# Patient Record
Sex: FEMALE | Race: WHITE | NOT HISPANIC OR LATINO
[De-identification: names, ages, dates, MRNs, and addresses within clinical notes are randomized per-mention and may not be internally consistent; named-entity substitution may affect disease eponyms.]

---

## 2021-06-07 ENCOUNTER — NON-APPOINTMENT (OUTPATIENT)
Age: 31
End: 2021-06-07

## 2021-06-10 PROBLEM — Z00.00 ENCOUNTER FOR PREVENTIVE HEALTH EXAMINATION: Status: ACTIVE | Noted: 2021-06-10

## 2021-06-14 ENCOUNTER — APPOINTMENT (OUTPATIENT)
Dept: OTOLARYNGOLOGY | Facility: CLINIC | Age: 31
End: 2021-06-14
Payer: COMMERCIAL

## 2021-06-14 VITALS — BODY MASS INDEX: 31.54 KG/M2 | TEMPERATURE: 98.3 F | WEIGHT: 178 LBS | HEIGHT: 63 IN

## 2021-06-14 DIAGNOSIS — J32.0 CHRONIC MAXILLARY SINUSITIS: ICD-10-CM

## 2021-06-14 DIAGNOSIS — R43.0 ANOSMIA: ICD-10-CM

## 2021-06-14 DIAGNOSIS — J34.2 DEVIATED NASAL SEPTUM: ICD-10-CM

## 2021-06-14 DIAGNOSIS — R43.9 UNSPECIFIED DISTURBANCES OF SMELL AND TASTE: ICD-10-CM

## 2021-06-14 DIAGNOSIS — Z78.9 OTHER SPECIFIED HEALTH STATUS: ICD-10-CM

## 2021-06-14 PROCEDURE — 99204 OFFICE O/P NEW MOD 45 MIN: CPT | Mod: 25

## 2021-06-14 PROCEDURE — 31231 NASAL ENDOSCOPY DX: CPT

## 2021-06-14 PROCEDURE — 99072 ADDL SUPL MATRL&STAF TM PHE: CPT

## 2021-06-14 NOTE — HISTORY OF PRESENT ILLNESS
[de-identified] : c/o problem with sense of smell and taste - problem started about 5 mos ago.  Was tested for covid and always negative.  No problems breathing thru nose.  Hx of trauma to nose and seen at [facility] - no definite hx of fx but after that had issue with sense of smell and taste.   No nasal discharge.  No hx of freq sinus infections.   Did here crack when nose was injured.  ALso still sl tender over nasal dorsum

## 2021-06-14 NOTE — ASSESSMENT
[FreeTextEntry1] : Patient with hx of loss of sense of smell which began 4-5 mos ago after nasal trauma.  No hx of covid. No evidence of sinusitis - does have mild dns to right.  Recommended CT of sinuses and further treatment pending result - may also consider neuro eval.

## 2021-06-14 NOTE — REASON FOR VISIT
[Initial Consultation] : an initial consultation for [FreeTextEntry2] : no smell and had ?  broken nose in the past - nasal trauma

## 2021-06-14 NOTE — PHYSICAL EXAM
[Nasal Endoscopy Performed] : nasal endoscopy was performed, see procedure section for findings [] : septum deviated to the left [Midline] : trachea located in midline position [Normal] : no rashes [de-identified] : sl tender over nasal dorsum

## 2021-06-23 ENCOUNTER — APPOINTMENT (OUTPATIENT)
Dept: CT IMAGING | Facility: CLINIC | Age: 31
End: 2021-06-23
Payer: COMMERCIAL

## 2021-06-23 ENCOUNTER — OUTPATIENT (OUTPATIENT)
Dept: OUTPATIENT SERVICES | Facility: HOSPITAL | Age: 31
LOS: 1 days | End: 2021-06-23
Payer: COMMERCIAL

## 2021-06-23 DIAGNOSIS — R43.0 ANOSMIA: ICD-10-CM

## 2021-06-23 DIAGNOSIS — R43.9 UNSPECIFIED DISTURBANCES OF SMELL AND TASTE: ICD-10-CM

## 2021-06-23 DIAGNOSIS — J32.0 CHRONIC MAXILLARY SINUSITIS: ICD-10-CM

## 2021-06-23 PROCEDURE — 70486 CT MAXILLOFACIAL W/O DYE: CPT | Mod: 26

## 2021-06-23 PROCEDURE — 70486 CT MAXILLOFACIAL W/O DYE: CPT

## 2021-06-24 ENCOUNTER — NON-APPOINTMENT (OUTPATIENT)
Age: 31
End: 2021-06-24

## 2021-07-26 ENCOUNTER — APPOINTMENT (OUTPATIENT)
Dept: OTOLARYNGOLOGY | Facility: CLINIC | Age: 31
End: 2021-07-26

## 2021-10-06 ENCOUNTER — NON-APPOINTMENT (OUTPATIENT)
Age: 31
End: 2021-10-06

## 2021-12-09 ENCOUNTER — APPOINTMENT (OUTPATIENT)
Dept: NEUROLOGY | Facility: CLINIC | Age: 31
End: 2021-12-09
Payer: COMMERCIAL

## 2021-12-09 VITALS
WEIGHT: 176 LBS | DIASTOLIC BLOOD PRESSURE: 68 MMHG | HEART RATE: 76 BPM | HEIGHT: 63 IN | TEMPERATURE: 97.8 F | SYSTOLIC BLOOD PRESSURE: 118 MMHG | BODY MASS INDEX: 31.18 KG/M2

## 2021-12-09 DIAGNOSIS — R42 DIZZINESS AND GIDDINESS: ICD-10-CM

## 2021-12-09 PROCEDURE — 99203 OFFICE O/P NEW LOW 30 MIN: CPT

## 2021-12-09 RX ORDER — METHYLPREDNISOLONE 4 MG/1
4 TABLET ORAL
Qty: 1 | Refills: 0 | Status: DISCONTINUED | COMMUNITY
Start: 2021-06-14 | End: 2021-12-09

## 2022-03-21 ENCOUNTER — APPOINTMENT (OUTPATIENT)
Dept: NEUROLOGY | Facility: CLINIC | Age: 32
End: 2022-03-21

## 2022-03-21 NOTE — HISTORY OF PRESENT ILLNESS
[FreeTextEntry1] : 31-year-old woman referred by ENT because of a one-year history of loss of smell and diminished noted after a fall, struck her head, did not pass out, at that time. No nasal stuffiness, or a liquid dripping from the nose,no headaches, but did notice inability to taste. Evaluated by ENT, treated with short-term steroids, without any improvement. CT scan maxillofacial was negative for any evidence of mass or fracture.\par More recently noted transient vertigo, induced by change in position, lasting a few minutes then went away. \par No earache, no headache, no diminished hearing, or ringing in the ears. No difficulty walking, no transient or lateral weakness or numbness. \par No history of diabetes, Lyme disease, no hx of  rheumatological or auto immune disorders.

## 2022-03-21 NOTE — DATA REVIEWED
[de-identified] : :  CT MAXILLOFACIAL  \par \par \par PROCEDURE DATE:  06/23/2021  \par  \par \par \par INTERPRETATION:  INDICATION:  Status post trauma with anosmia. Rule out sinusitis or fracture.\par \par TECHNIQUE:    Thin section axial CT imaging of the sinuses was performed. Sagittal and coronal reformations were generated from the thin section axial data. The study was performed with [employer] stereotactic protocol.\par \par COMPARISON EXAMINATION:  None.\par \par FINDINGS:\par FRONTAL SINUSES:  Well developed and clear.\par ETHMOID SINUSES:  Well developed and clear.\par MAXILLARY SINUSES:  Well developed and clear.\par SPHENOID SINUSES:  Well developed and clear.\par NASAL SEPTUM:  Deviated to the right with narrowing of the right nasal cavity\par NASAL CAVITY/NASOPHARYNX:  No polypoid mass. Soft tissue is seen within the olfactory recesses bilaterally which may represent mucosal thickening or apposition of mucosal surfaces.\par POSTOP CHANGES:  None seen.\par RIGHT OSTIOMEATAL UNIT:  A lateralized uncinate process narrows the infundibulum. A lateralized middle turbinate narrows the middle meatus\par LEFT OSTIOMEATAL UNIT:  Prominent pneumatization of the bulla ethmoidalis narrows the infundibulum. A pneumatized middle turbinate narrows the middle meatus.\par SPHENOETHMOIDAL RECESSES:  Patent.\par RIGHT FRONTAL RECESS:  Narrowed by an agger nasi cell and frontal cell\par LEFT FRONTAL RECESS:  Narrowed by a large agger nasi cell which uplifts the frontal sinus floor or merges with a type III frontal cell\par BONES:  The bones surrounding the paranasal sinuses are intact. There is flattening of the right fovea ethmoidalis\par VISUALIZED INTRACRANIAL STRUCTURES:  Normal.\par ORBITAL CONTENTS:  Normal.\par MISCELLANEOUS:  None.\par \par IMPRESSION:   No evidence of acute inflammatory disease of the paranasal sinuses.\par \par No fracture.\par \par Nasal septal deviation to the right.\par \par Narrowing of drainage pathways which may predispose to sinus outflow obstruction.\par \par Flattening of the right fovea ethmoidalis.

## 2022-03-21 NOTE — PHYSICAL EXAM
[General Appearance - Alert] : alert [General Appearance - In No Acute Distress] : in no acute distress [Oriented To Time, Place, And Person] : oriented to person, place, and time [Impaired Insight] : insight and judgment were intact [Affect] : the affect was normal [Person] : oriented to person [Place] : oriented to place [Time] : oriented to time [Concentration Intact] : normal concentrating ability [Visual Intact] : visual attention was ~T not ~L decreased [Naming Objects] : no difficulty naming common objects [Repeating Phrases] : no difficulty repeating a phrase [Writing A Sentence] : no difficulty writing a sentence [Fluency] : fluency intact [Comprehension] : comprehension intact [Reading] : reading intact [Past History] : adequate knowledge of personal past history [Cranial Nerves Optic (II)] : visual acuity intact bilaterally,  visual fields full to confrontation, pupils equal round and reactive to light [Cranial Nerves Oculomotor (III)] : extraocular motion intact [Cranial Nerves Trigeminal (V)] : facial sensation intact symmetrically [Cranial Nerves Facial (VII)] : face symmetrical [Cranial Nerves Vestibulocochlear (VIII)] : hearing was intact bilaterally [Cranial Nerves Glossopharyngeal (IX)] : tongue and palate midline [Cranial Nerves Accessory (XI - Cranial And Spinal)] : head turning and shoulder shrug symmetric [Cranial Nerves Hypoglossal (XII)] : there was no tongue deviation with protrusion [Motor Tone] : muscle tone was normal in all four extremities [Motor Strength] : muscle strength was normal in all four extremities [No Muscle Atrophy] : normal bulk in all four extremities [Sensation Tactile Decrease] : light touch was intact [Abnormal Walk] : normal gait [Balance] : balance was intact [2+] : Ankle jerk left 2+ [Sclera] : the sclera and conjunctiva were normal [PERRL With Normal Accommodation] : pupils were equal in size, round, reactive to light, with normal accommodation [Extraocular Movements] : extraocular movements were intact [Full Visual Field] : full visual field [Outer Ear] : the ears and nose were normal in appearance [Hearing Threshold Finger Rub Not Lipscomb] : hearing was normal [Neck Appearance] : the appearance of the neck was normal [] : the neck was supple [Neck Cervical Mass (___cm)] : no neck mass was observed [Arterial Pulses Carotid] : carotid pulses were normal with no bruits [Past-pointing] : there was no past-pointing [Tremor] : no tremor present [Plantar Reflex Right Only] : normal on the right [Plantar Reflex Left Only] : normal on the left

## 2022-03-21 NOTE — DISCUSSION/SUMMARY
[FreeTextEntry1] : 31-year-old woman history of anosmia, transient vertigo, rule out base of  skull infiltrative process.\par We'll order an MRI of the brain, and internal ear canals with and without gadolinium.\par

## 2022-07-06 ENCOUNTER — NON-APPOINTMENT (OUTPATIENT)
Age: 32
End: 2022-07-06

## 2022-07-08 ENCOUNTER — NON-APPOINTMENT (OUTPATIENT)
Age: 32
End: 2022-07-08

## 2022-07-08 DIAGNOSIS — Z01.818 ENCOUNTER FOR OTHER PREPROCEDURAL EXAMINATION: ICD-10-CM

## 2022-07-12 ENCOUNTER — OUTPATIENT (OUTPATIENT)
Dept: OUTPATIENT SERVICES | Facility: HOSPITAL | Age: 32
LOS: 1 days | End: 2022-07-12
Payer: COMMERCIAL

## 2022-07-12 VITALS
HEIGHT: 63 IN | TEMPERATURE: 98 F | WEIGHT: 178.79 LBS | OXYGEN SATURATION: 99 % | HEART RATE: 76 BPM | DIASTOLIC BLOOD PRESSURE: 80 MMHG | RESPIRATION RATE: 17 BRPM | SYSTOLIC BLOOD PRESSURE: 110 MMHG

## 2022-07-12 DIAGNOSIS — Z91.89 OTHER SPECIFIED PERSONAL RISK FACTORS, NOT ELSEWHERE CLASSIFIED: ICD-10-CM

## 2022-07-12 DIAGNOSIS — Z01.818 ENCOUNTER FOR OTHER PREPROCEDURAL EXAMINATION: ICD-10-CM

## 2022-07-12 DIAGNOSIS — Z33.2 ENCOUNTER FOR ELECTIVE TERMINATION OF PREGNANCY: ICD-10-CM

## 2022-07-12 LAB
BASOPHILS # BLD AUTO: 0.03 K/UL — SIGNIFICANT CHANGE UP (ref 0–0.2)
BASOPHILS NFR BLD AUTO: 0.2 % — SIGNIFICANT CHANGE UP (ref 0–2)
BLD GP AB SCN SERPL QL: SIGNIFICANT CHANGE UP
COMMENT - BLOOD BANK: SIGNIFICANT CHANGE UP
EOSINOPHIL # BLD AUTO: 0.15 K/UL — SIGNIFICANT CHANGE UP (ref 0–0.5)
EOSINOPHIL NFR BLD AUTO: 1.1 % — SIGNIFICANT CHANGE UP (ref 0–6)
HCT VFR BLD CALC: 34 % — LOW (ref 34.5–45)
HGB BLD-MCNC: 11.8 G/DL — SIGNIFICANT CHANGE UP (ref 11.5–15.5)
IMM GRANULOCYTES NFR BLD AUTO: 0.9 % — SIGNIFICANT CHANGE UP (ref 0–1.5)
LYMPHOCYTES # BLD AUTO: 16.8 % — SIGNIFICANT CHANGE UP (ref 13–44)
LYMPHOCYTES # BLD AUTO: 2.24 K/UL — SIGNIFICANT CHANGE UP (ref 1–3.3)
MCHC RBC-ENTMCNC: 31.4 PG — SIGNIFICANT CHANGE UP (ref 27–34)
MCHC RBC-ENTMCNC: 34.7 GM/DL — SIGNIFICANT CHANGE UP (ref 32–36)
MCV RBC AUTO: 90.4 FL — SIGNIFICANT CHANGE UP (ref 80–100)
MONOCYTES # BLD AUTO: 0.77 K/UL — SIGNIFICANT CHANGE UP (ref 0–0.9)
MONOCYTES NFR BLD AUTO: 5.8 % — SIGNIFICANT CHANGE UP (ref 2–14)
NEUTROPHILS # BLD AUTO: 10.03 K/UL — HIGH (ref 1.8–7.4)
NEUTROPHILS NFR BLD AUTO: 75.2 % — SIGNIFICANT CHANGE UP (ref 43–77)
PLATELET # BLD AUTO: 228 K/UL — SIGNIFICANT CHANGE UP (ref 150–400)
RBC # BLD: 3.76 M/UL — LOW (ref 3.8–5.2)
RBC # FLD: 13.1 % — SIGNIFICANT CHANGE UP (ref 10.3–14.5)
WBC # BLD: 13.34 K/UL — HIGH (ref 3.8–10.5)
WBC # FLD AUTO: 13.34 K/UL — HIGH (ref 3.8–10.5)

## 2022-07-12 PROCEDURE — G0463: CPT

## 2022-07-12 NOTE — H&P PST ADULT - NSANTHOSAYNRD_GEN_A_CORE
No. [place] screening performed.  STOP BANG Legend: 0-2 = LOW Risk; 3-4 = INTERMEDIATE Risk; 5-8 = HIGH Risk

## 2022-07-12 NOTE — H&P PST ADULT - ASSESSMENT
Patient educated on surgical scrub, COVID testing, preadmission instructions, medical clearance and day of procedure medications, verbalizes understanding. Pt instructed to stop vitamins/supplements/herbal medications/ASA/NSAIDS for one week prior to surgery and discuss with PMD.    CAPRINI SCORE    AGE RELATED RISK FACTORS                                                             [ ] Age 41-60 years                                            (1 Point)  [ ] Age: 61-74 years                                           (2 Points)                 [ ] Age= 75 years                                                (3 Points)             DISEASE RELATED RISK FACTORS                                                       [ ] Edema in the lower extremities                 (1 Point)                     [ ] Varicose veins                                               (1 Point)                                 [ ] BMI > 25 Kg/m2                                            (1 Point)                                  [ ] Serious infection (ie PNA)                            (1 Point)                     [ ] Lung disease ( COPD, Emphysema)            (1 Point)                                                                          [ ] Acute myocardial infarction                         (1 Point)                  [ ] Congestive heart failure (in the previous month)  (1 Point)         [ ] Inflammatory bowel disease                            (1 Point)                  [ ] Central venous access, PICC or Port               (2 points)       (within the last month)                                                                [ ] Stroke (in the previous month)                        (5 Points)    [ ] Previous or present malignancy                       (2 points)                                                                                                                                                         HEMATOLOGY RELATED FACTORS                                                         [ ] Prior episodes of VTE                                     (3 Points)                     [ ] Positive family history for VTE                      (3 Points)                  [ ] Prothrombin 88288 A                                     (3 Points)                     [ ] Factor V Leiden                                                (3 Points)                        [ ] Lupus anticoagulants                                      (3 Points)                                                           [ ] Anticardiolipin antibodies                              (3 Points)                                                       [ ] High homocysteine in the blood                   (3 Points)                                             [ ] Other congenital or acquired thrombophilia      (3 Points)                                                [ ] Heparin induced thrombocytopenia                  (3 Points)                                        MOBILITY RELATED FACTORS  [ ] Bed rest                                                         (1 Point)  [ ] Plaster cast                                                    (2 points)  [ ] Bed bound for more than 72 hours           (2 Points)    GENDER SPECIFIC FACTORS  [ ] Pregnancy or had a baby within the last month   (1 Point)  [ ] Post-partum < 6 weeks                                   (1 Point)  [ ] Hormonal therapy  or oral contraception   (1 Point)  [ ] History of pregnancy complications              (1 point)  [ ] Unexplained or recurrent              (1 Point)    OTHER RISK FACTORS                                           (1 Point)  [ ] BMI >40, smoking, diabetes requiring insulin, chemotherapy  blood transfusions and length of surgery over 2 hours    SURGERY RELATED RISK FACTORS  [ ]  Section within the last month     (1 Point)  [ ] Minor surgery                                                  (1 Point)  [ ] Arthroscopic surgery                                       (2 Points)  [ ] Planned major surgery lasting more            (2 Points)      than 45 minutes     [ ] Elective hip or knee joint replacement       (5 points)       surgery                                                TRAUMA RELATED RISK FACTORS  [ ] Fracture of the hip, pelvis, or leg                       (5 Points)  [ ] Spinal cord injury resulting in paralysis             (5 points)       (in the previous month)    [ ] Paralysis  (less than 1 month)                             (5 Points)  [ ] Multiple Trauma within 1 month                        (5 Points)    Total Score [        ]    Caprini Score 0-2: Low Risk, NO VTE prophylaxis required for most patients, encourage ambulation  Caprini Score 3-6: Moderate Risk , pharmacologic VTE prophylaxis is indicated for most patients (in the absence of contraindications)  Caprini Score Greater than or =7: High risk, pharmocologic VTE prophylaxis indicated for most patients (in the absence of contraindications)    OPIOID RISK TOOL    MIYA EACH BOX THAT APPLIES AND ADD TOTALS AT THE END    FAMILY HISTORY OF SUBSTANCE ABUSE                 FEMALE         MALE                                                Alcohol                             [  ]1 pt          [  ]3pts                                               Illegal Durgs                     [  ]2 pts        [  ]3pts                                               Rx Drugs                           [  ]4 pts        [  ]4 pts    PERSONAL HISTORY OF SUBSTANCE ABUSE                                                                                          Alcohol                             [  ]3 pts       [  ]3 pts                                               Illegal Drugs                     [  ]4 pts        [  ]4 pts                                               Rx Drugs                           [  ]5 pts        [  ]5 pts    AGE BETWEEN 16-45 YEARS                                      [  ]1 pt         [  ]1 pt    HISTORY OF PREADOLESCENT   SEXUAL ABUSE                                                             [  ]3 pts        [  ]0pts    PSYCHOLOGICAL DISEASE                     ADD, OCD, Bipolar, Schizophrenia        [  ]2 pts         [  ]2 pts                      Depression                                               [  ]1 pt           [  ]1 pt           SCORING TOTAL   (add numbers and type here)              (***)                                     A score of 3 or lower indicated LOW risk for future opioid abuse  A score of 4 to 7 indicated moderate risk for future opioid abuse  A score of 8 or higher indicates a high risk for opioid abuse   32 y/o female  LMP ~5 months ago (irregular periods) presents to PST currently 20 weeks pregnant. Patient states she has irregular menses, was currently on birth control. States she started to feel she was gaining weight so she decided to take a urine home pregnancy test which was positive. States the pregnancy was unplanned and she wishes to terminate. [facility] due to the gestational age of fetus. Patient is scheduled for Ultrasound guided dilation and evacuation second trimester with suction on 22 with Dr. Retana. Patient educated on COVID testing completed on 22, preadmission instructions and day of procedure medications, verbalizes understanding. Pt instructed to stop vitamins/supplements/herbal medications/ASA/NSAIDS for one week prior to surgery and discuss with PMD.    CAPRINI SCORE    AGE RELATED RISK FACTORS                                                             [ ] Age 41-60 years                                            (1 Point)  [ ] Age: 61-74 years                                           (2 Points)                 [ ] Age= 75 years                                                (3 Points)             DISEASE RELATED RISK FACTORS                                                       [ ] Edema in the lower extremities                 (1 Point)                     [ ] Varicose veins                                               (1 Point)                                 [x ] BMI > 25 Kg/m2                                            (1 Point)                                  [ ] Serious infection (ie PNA)                            (1 Point)                     [ ] Lung disease ( COPD, Emphysema)            (1 Point)                                                                          [ ] Acute myocardial infarction                         (1 Point)                  [ ] Congestive heart failure (in the previous month)  (1 Point)         [ ] Inflammatory bowel disease                            (1 Point)                  [ ] Central venous access, PICC or Port               (2 points)       (within the last month)                                                                [ ] Stroke (in the previous month)                        (5 Points)    [ ] Previous or present malignancy                       (2 points)                                                                                                                                                         HEMATOLOGY RELATED FACTORS                                                         [ ] Prior episodes of VTE                                     (3 Points)                     [ ] Positive family history for VTE                      (3 Points)                  [ ] Prothrombin 28977 A                                     (3 Points)                     [ ] Factor V Leiden                                                (3 Points)                        [ ] Lupus anticoagulants                                      (3 Points)                                                           [ ] Anticardiolipin antibodies                              (3 Points)                                                       [ ] High homocysteine in the blood                   (3 Points)                                             [ ] Other congenital or acquired thrombophilia      (3 Points)                                                [ ] Heparin induced thrombocytopenia                  (3 Points)                                        MOBILITY RELATED FACTORS  [ ] Bed rest                                                         (1 Point)  [ ] Plaster cast                                                    (2 points)  [ ] Bed bound for more than 72 hours           (2 Points)    GENDER SPECIFIC FACTORS  [x ] Pregnancy or had a baby within the last month   (1 Point)  [ ] Post-partum < 6 weeks                                   (1 Point)  [ ] Hormonal therapy  or oral contraception   (1 Point)  [ ] History of pregnancy complications              (1 point)  [ ] Unexplained or recurrent              (1 Point)    OTHER RISK FACTORS                                           (1 Point)  [ ] BMI >40, smoking, diabetes requiring insulin, chemotherapy  blood transfusions and length of surgery over 2 hours    SURGERY RELATED RISK FACTORS  [ ]  Section within the last month     (1 Point)  [x ] Minor surgery                                                  (1 Point)  [ ] Arthroscopic surgery                                       (2 Points)  [ ] Planned major surgery lasting more            (2 Points)      than 45 minutes     [ ] Elective hip or knee joint replacement       (5 points)       surgery                                                TRAUMA RELATED RISK FACTORS  [ ] Fracture of the hip, pelvis, or leg                       (5 Points)  [ ] Spinal cord injury resulting in paralysis             (5 points)       (in the previous month)    [ ] Paralysis  (less than 1 month)                             (5 Points)  [ ] Multiple Trauma within 1 month                        (5 Points)    Total Score [   3     ]    Caprini Score 0-2: Low Risk, NO VTE prophylaxis required for most patients, encourage ambulation  Caprini Score 3-6: Moderate Risk , pharmacologic VTE prophylaxis is indicated for most patients (in the absence of contraindications)  Caprini Score Greater than or =7: High risk, pharmocologic VTE prophylaxis indicated for most patients (in the absence of contraindications)    OPIOID RISK TOOL    MIYA EACH BOX THAT APPLIES AND ADD TOTALS AT THE END    FAMILY HISTORY OF SUBSTANCE ABUSE                 FEMALE         MALE                                                Alcohol                             [  ]1 pt          [  ]3pts                                               Illegal Durgs                     [  ]2 pts        [  ]3pts                                               Rx Drugs                           [  ]4 pts        [  ]4 pts    PERSONAL HISTORY OF SUBSTANCE ABUSE                                                                                          Alcohol                             [  ]3 pts       [  ]3 pts                                               Illegal Drugs                     [  ]4 pts        [  ]4 pts                                               Rx Drugs                           [  ]5 pts        [  ]5 pts    AGE BETWEEN 16-45 YEARS                                      [ x ]1 pt         [  ]1 pt    HISTORY OF PREADOLESCENT   SEXUAL ABUSE                                                             [  ]3 pts        [  ]0pts    PSYCHOLOGICAL DISEASE                     ADD, OCD, Bipolar, Schizophrenia        [  ]2 pts         [  ]2 pts                      Depression                                               [  ]1 pt           [  ]1 pt           SCORING TOTAL   (add numbers and type here)              (*1**)                                     A score of 3 or lower indicated LOW risk for future opioid abuse  A score of 4 to 7 indicated moderate risk for future opioid abuse  A score of 8 or higher indicates a high risk for opioid abuse

## 2022-07-12 NOTE — H&P PST ADULT - GASTROINTESTINAL
negative normal/soft/nontender/nondistended/normal active bowel sounds/no guarding/no rigidity/no organomegaly/no masses palpable

## 2022-07-12 NOTE — H&P PST ADULT - CARDIOVASCULAR
negative normal/regular rate and rhythm/S1 S2 present/no gallops/no rub/no murmur/normal PMI/no pedal edema

## 2022-07-12 NOTE — H&P PST ADULT - HISTORY OF PRESENT ILLNESS
30 y/o female  LMP ~5 months ago (irregular periods) presents to PST currently 20 weeks pregnant. Patient states she has irregular menses, was currently on birth control. States she started to feel she was gaining weight so she decided to take a urine home pregnancy test which was positive. States the pregnancy was unplanned and she wishes to terminate. [facility] to [facility] due to the gestational age of fetus. Patient is scheduled for Ultrasound guided dilation and evacuation second trimester with suction on 22 with Dr. Retana.

## 2022-07-12 NOTE — H&P PST ADULT - NEGATIVE ENMT SYMPTOMS
no hearing difficulty/no ear pain/no tinnitus/no nasal discharge/no post-nasal discharge/no dysphagia

## 2022-07-12 NOTE — H&P PST ADULT - MUSCULOSKELETAL
negative normal/ROM intact/no calf tenderness/normal gait/strength 5/5 bilateral upper extremities/strength 5/5 bilateral lower extremities

## 2022-07-12 NOTE — H&P PST ADULT - PROBLEM SELECTOR PLAN 1
Patient is scheduled for Ultrasound guided dilation and evacuation second trimester with suction on 7/14/22 with Dr. Retana.

## 2022-07-13 ENCOUNTER — TRANSCRIPTION ENCOUNTER (OUTPATIENT)
Age: 32
End: 2022-07-13

## 2022-07-13 ENCOUNTER — APPOINTMENT (OUTPATIENT)
Dept: OBGYN | Facility: CLINIC | Age: 32
End: 2022-07-13

## 2022-07-13 VITALS
HEIGHT: 63 IN | HEART RATE: 80 BPM | OXYGEN SATURATION: 99 % | DIASTOLIC BLOOD PRESSURE: 82 MMHG | SYSTOLIC BLOOD PRESSURE: 124 MMHG

## 2022-07-13 DIAGNOSIS — Z33.2 ENCOUNTER FOR ELECTIVE TERMINATION OF PREGNANCY: ICD-10-CM

## 2022-07-13 LAB — SARS-COV-2 N GENE NPH QL NAA+PROBE: NOT DETECTED

## 2022-07-13 PROCEDURE — 59200 INSERT CERVICAL DILATOR: CPT

## 2022-07-13 PROCEDURE — 99205 OFFICE O/P NEW HI 60 MIN: CPT | Mod: 25,57

## 2022-07-13 RX ORDER — DROSPIRENONE AND ETHINYL ESTRADIOL 0.02-3(28)
3-0.02 KIT ORAL DAILY
Qty: 3 | Refills: 3 | Status: ACTIVE | COMMUNITY
Start: 2022-07-13 | End: 1900-01-01

## 2022-07-13 RX ORDER — NORETHINDRONE ACETATE AND ETHINYL ESTRADIOL AND FERROUS FUMARATE 1.5-30(21)
KIT ORAL
Refills: 0 | Status: DISCONTINUED | COMMUNITY
End: 2022-07-13

## 2022-07-13 RX ORDER — MIFEPRISTONE 200 MG
200 TABLET ORAL
Refills: 0 | Status: COMPLETED | OUTPATIENT
Start: 2022-07-13

## 2022-07-13 RX ORDER — DOXYCYCLINE HYCLATE 100 MG/1
100 TABLET ORAL
Qty: 2 | Refills: 0 | Status: ACTIVE | COMMUNITY
Start: 2022-07-13 | End: 1900-01-01

## 2022-07-13 RX ORDER — MIFEPRISTONE 200 MG
200 TABLET ORAL
Refills: 0 | Status: ACTIVE | COMMUNITY
Start: 2022-07-13

## 2022-07-13 RX ORDER — IBUPROFEN 600 MG/1
600 TABLET, FILM COATED ORAL 4 TIMES DAILY
Qty: 60 | Refills: 0 | Status: ACTIVE | COMMUNITY
Start: 2022-07-13 | End: 1900-01-01

## 2022-07-13 RX ADMIN — Medication 0 MG: at 00:00

## 2022-07-13 NOTE — HISTORY OF PRESENT ILLNESS
[FreeTextEntry1] : 30 yo  at 20w3d by second trimester ultrasound confirmed by US today presenting for TOP. Pt referred by [facility] for gestational age. Pt conceived taking OCPs. Pt remains interested in OCPs. Does not want other form of contraception. 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	Infection: Patient was counseled on risk of infection and the use of prophylactic antibiotics, signs/symptoms of pre- and post-operative infection were reviewed. \par 2.	Hemorrhage: Patient was counseled on the risk of hemorrhage, possibly requiring blood (and/or blood products) transfusion, management including use of but not limited to uterotonic medications. PT HAS NO OBJECTIONS TO BLOOD TRANSFUSION OR RECEIVING BLOOD PRODUCTS.\par 3.	Injury/Perforation:  Risk of injury to vagina, cervix, uterus reviewed. Patient was counseled on the risk of uterine perforation with/without need for laparoscopy/laparotomy with/without injury to adjacent organs such as bowel/bladder. Reviewed risk of hysterectomy.\par 4.            Risk of retained products of conception  with/without need for medication or suction procedure to empty the uterus. \par \par The risk of harm to subsequent pregnancies or the ability to carry a subsequent pregnancy to term, and adverse psychological effects were discussed.  \par \par Need for cervical ripening with mifepristone, pre-operative laminaria placement; the accompanying risks of infection, bleeding, injury, rupture of membranes, and  labor were reviewed. The risks of delivery of a nonviable  were reviewed.  They understand these risks and agrees to above. \par \par The patient also understands it is their responsibility to bring to the attention of their physician any unusual symptoms following the  and to report to follow-up examinations.  \par \par They are sure of their decision and deny any coercion from family, friends or healthcare providers. The patient had the opportunity to ask questions and all questions were answered.  \par \par Contraceptive Counseling\par \par All forms of reversible contraception including combined hormonal contraception, progestin-only contraceptives, IUDs, and implants were reviewed with the patient.  The risks, benefits, and alternatives were discussed. \par  \par CHC including pill, patch, and ring, was discussed with the patient. Common side-effects of CHC were reviewed.  Typical effectiveness rates of 91% were reviewed.\par  \par The patient as instructed in the correct use of combined methods. The patient was also counseled about the reduced contraceptive effectiveness if doses are missed and the recommendation for condom use for 1 week after.  The patient was counseled on the risk of blood clot and stroke, but that the risk of stroke from pregnancy outweighs that from CHC.  The patient denies history of blood clot/hypertension/CVA/migraine with aura/breast cancer/liver disease/gallbladder disease/family history of first degree relative with DVT/CVA.   Reviewed the option of continuous CHC and that breakthrough bleeding may occur with a continuous regimen.\par  \par 
negative...

## 2022-07-13 NOTE — PLAN
[FreeTextEntry1] : 30 yo  at 20w3d by second trimester ultrasound 2022\par \par 1.Dilation and Evacuation \par -All available records and ultrasounds have been reviewed \par - Pt does not desire induction of labor\par -All consents reviewed and/or signed today, all questions/concerns addressed\par \par 2. Surgery scheduling\par - Patient to be precertified for D+E\par - D+E scheduled for tomorrow \par -PSTs, COVID testing scheduled and reviewed\par \par 3. ID/Cervical prep\par - GC/CT - declined\par - doxycycline 200 mg in OR\par - Ibuprofen 600 mg po q 6 hours - Rx sent\par - doxycycline 100mg BID x 1 day for day of dilator placement\par - mifepristone today\par \par 4. Labs/Blood type\par - to get CBC, T+S, at PST’s\par -  B POS\par \par 5. Contraception/Future Pregnancy Plans\par - Patient desires quick start  OCPs\par \par 6. Post-op\par - Post-operative telehealth or in person visit optional- to be scheduled in 2 weeks\par - Post-operative instruction sheet reviewed/given, reviewed bleeding and infection precautions\par - Provided 24 hour contact phone number\par - All questions/concerns of patient addressed to their satisfaction\par

## 2022-07-14 ENCOUNTER — APPOINTMENT (OUTPATIENT)
Dept: OBGYN | Facility: CLINIC | Age: 32
End: 2022-07-14

## 2022-07-14 ENCOUNTER — RESULT REVIEW (OUTPATIENT)
Age: 32
End: 2022-07-14

## 2022-07-14 ENCOUNTER — TRANSCRIPTION ENCOUNTER (OUTPATIENT)
Age: 32
End: 2022-07-14

## 2022-07-14 ENCOUNTER — OUTPATIENT (OUTPATIENT)
Dept: INPATIENT UNIT | Facility: HOSPITAL | Age: 32
LOS: 1 days | End: 2022-07-14
Payer: COMMERCIAL

## 2022-07-14 VITALS
DIASTOLIC BLOOD PRESSURE: 71 MMHG | OXYGEN SATURATION: 100 % | HEART RATE: 78 BPM | SYSTOLIC BLOOD PRESSURE: 109 MMHG | TEMPERATURE: 99 F | RESPIRATION RATE: 17 BRPM | WEIGHT: 178.57 LBS | HEIGHT: 63 IN

## 2022-07-14 VITALS
TEMPERATURE: 98 F | DIASTOLIC BLOOD PRESSURE: 62 MMHG | HEART RATE: 67 BPM | SYSTOLIC BLOOD PRESSURE: 121 MMHG | OXYGEN SATURATION: 99 % | RESPIRATION RATE: 19 BRPM

## 2022-07-14 DIAGNOSIS — Z33.2 ENCOUNTER FOR ELECTIVE TERMINATION OF PREGNANCY: ICD-10-CM

## 2022-07-14 PROBLEM — Z78.9 OTHER SPECIFIED HEALTH STATUS: Chronic | Status: ACTIVE | Noted: 2022-07-12

## 2022-07-14 LAB — BLD GP AB SCN SERPL QL: SIGNIFICANT CHANGE UP

## 2022-07-14 PROCEDURE — 86900 BLOOD TYPING SEROLOGIC ABO: CPT

## 2022-07-14 PROCEDURE — 59841 INDUCED ABORTION DILAT&EVAC: CPT

## 2022-07-14 PROCEDURE — 88304 TISSUE EXAM BY PATHOLOGIST: CPT | Mod: 26

## 2022-07-14 PROCEDURE — 88304 TISSUE EXAM BY PATHOLOGIST: CPT

## 2022-07-14 PROCEDURE — 86850 RBC ANTIBODY SCREEN: CPT

## 2022-07-14 PROCEDURE — 86901 BLOOD TYPING SEROLOGIC RH(D): CPT

## 2022-07-14 PROCEDURE — ZZZZZ: CPT

## 2022-07-14 PROCEDURE — 76998 US GUIDE INTRAOP: CPT | Mod: 26

## 2022-07-14 PROCEDURE — 36415 COLL VENOUS BLD VENIPUNCTURE: CPT

## 2022-07-14 PROCEDURE — 59840 INDUCED ABORTION D&C: CPT | Mod: GC

## 2022-07-14 RX ORDER — HYDROMORPHONE HYDROCHLORIDE 2 MG/ML
0.5 INJECTION INTRAMUSCULAR; INTRAVENOUS; SUBCUTANEOUS
Refills: 0 | Status: DISCONTINUED | OUTPATIENT
Start: 2022-07-14 | End: 2022-07-14

## 2022-07-14 RX ORDER — SODIUM CHLORIDE 9 MG/ML
3 INJECTION INTRAMUSCULAR; INTRAVENOUS; SUBCUTANEOUS ONCE
Refills: 0 | Status: DISCONTINUED | OUTPATIENT
Start: 2022-07-14 | End: 2022-07-14

## 2022-07-14 RX ORDER — ONDANSETRON 8 MG/1
4 TABLET, FILM COATED ORAL ONCE
Refills: 0 | Status: DISCONTINUED | OUTPATIENT
Start: 2022-07-14 | End: 2022-07-14

## 2022-07-14 RX ORDER — ACETAMINOPHEN 500 MG
975 TABLET ORAL ONCE
Refills: 0 | Status: COMPLETED | OUTPATIENT
Start: 2022-07-14 | End: 2022-07-14

## 2022-07-14 RX ADMIN — Medication 975 MILLIGRAM(S): at 12:00

## 2022-07-14 RX ADMIN — Medication 250 MILLIGRAM(S): at 13:22

## 2022-07-14 NOTE — ASU DISCHARGE PLAN (ADULT/PEDIATRIC) - NS MD DC FALL RISK RISK
For information on Fall & Injury Prevention, visit: [email or web address] OR  [email or web address] OR  https://www.cdc.gov/steadi/patient.html

## 2022-07-14 NOTE — ASU DISCHARGE PLAN (ADULT/PEDIATRIC) - CARE PROVIDER_API CALL
Abril Retana; MPH)  Obstetrics and Gynecology  [address] [place] [address], [facility] [address]  [place] [address]  Phone: [phone]  Fax: [phone]  Follow Up Time:

## 2022-07-14 NOTE — BRIEF OPERATIVE NOTE - NSICDXBRIEFPROCEDURE_GEN_ALL_CORE_FT
PROCEDURES:  Dilation and evacuation, uterus, using suction, with US guidance 14-Jul-2022 14:05:19  Elina Gonsales  Exam under anesthesia, pelvis 14-Jul-2022 14:05:29  Elina Gonsales

## 2022-07-14 NOTE — BRIEF OPERATIVE NOTE - OPERATION/FINDINGS
Grossly normal external genitalia; 1.5cm dilated, soft cervix; 20 weeks sized uterus; IUP seen on US; Thin endometrial stripe on US after evacuation

## 2022-07-22 LAB — SURGICAL PATHOLOGY STUDY: SIGNIFICANT CHANGE UP

## 2022-07-28 ENCOUNTER — APPOINTMENT (OUTPATIENT)
Dept: OBGYN | Facility: CLINIC | Age: 32
End: 2022-07-28

## 2022-07-28 DIAGNOSIS — Z98.890 OTHER SPECIFIED POSTPROCEDURAL STATES: ICD-10-CM

## 2022-07-28 PROCEDURE — 99024 POSTOP FOLLOW-UP VISIT: CPT

## 2022-07-28 NOTE — HISTORY OF PRESENT ILLNESS
[FreeTextEntry1] : Audiovisual Televisit\par Pt location: home, [address] [place] [address]\par Provider location: office, [address] [place] [address]\par \par 32 yo s/p induced  DE at 20 weeks. Pt reports no pain, doing well. No signs/sx of infection.

## 2022-07-28 NOTE — PLAN
[FreeTextEntry1] : 32 yo s/p DE 7/14 doing well.\par \par 1.Dilation and Evacuation\par -Patient is recovering well.  No signs/symptoms of infection. \par -Reviewed that first period may be heavier than normal. \par -Patient is cleared to return to all physical activities\par \par 2.Contraception\par - already started OCPs\par \par 3.  Psych\par - Discussed normal grieving process, reviewed support people\par - pt given social work/psych information sheet at consultation\par \par 4. Follow-up\par - Patient referred back to her primary Ob-gyn, for routine care\par - - all questions/concerns addressed of pt to their satisfaction\par

## 2022-09-22 ENCOUNTER — APPOINTMENT (OUTPATIENT)
Dept: OBGYN | Facility: CLINIC | Age: 32
End: 2022-09-22

## 2022-09-22 DIAGNOSIS — N93.9 ABNORMAL UTERINE AND VAGINAL BLEEDING, UNSPECIFIED: ICD-10-CM

## 2022-09-22 PROCEDURE — 99212 OFFICE O/P EST SF 10 MIN: CPT | Mod: 95

## 2022-09-22 RX ORDER — DROSPIRENONE AND ETHINYL ESTRADIOL 0.03MG-3MG
3-0.03 KIT ORAL
Qty: 3 | Refills: 3 | Status: ACTIVE | COMMUNITY
Start: 2022-09-22 | End: 1900-01-01

## 2022-09-22 NOTE — PLAN
[FreeTextEntry1] : 33 yo with break through bleedings on ocp. Will increase E dose. Instructed to finish current pill pack. Have withdrawal bleed and restart new pack with higher dose of estrogen. Monitor bleeding 2 months, return if unhappy with bleeding pattern.

## 2022-09-22 NOTE — HISTORY OF PRESENT ILLNESS
[FreeTextEntry1] : Audiovisual televisit\par Pt location: home, [address] [place] [address]\par Provider location: office, [facility] [place], [place]\par \par \par 33 yo  on OCPs s/p DE . Quick start method started 6/15. Reports bleeding/spotting for 27 days. Had a menses for 3-4 days in August.  Reports menses on - which was normal for her and then again on -, light bleeding. Again bleeding on \par \par Pt is otherwise feeling well. No missed pills. No recent COVID infection.

## 2024-10-08 ENCOUNTER — NON-APPOINTMENT (OUTPATIENT)
Age: 34
End: 2024-10-08

## 2025-01-15 NOTE — H&P PST ADULT - ENERGY EXPENDITURE (METS)
Spoke with patient today regarding Victoza. Patient states she received letter in the mail stating insurance would no longer cover Victoza and provided covered alternatives. Patient states she is hesitant to change medications, as has had to do this in the past, and patient does not do well with medication switches.     Advised [facility] will attempt prior authorization for Victoza (brand name) and follow-up next week.    Thank you,  Esme Mejia, JunD   6-8

## (undated) DEVICE — TUBING MEDI-VAC W MAXIGRIP CONNECTORS 1/4"X6'

## (undated) DEVICE — DRAPE LIGHT HANDLE COVER FLEX GREEN

## (undated) DEVICE — GLV 7 PROTEXIS

## (undated) DEVICE — GLV 8.5 PROTEXIS

## (undated) DEVICE — SUCTION YANKAUER TAPERED BULBOUS NO VENT

## (undated) DEVICE — BLANKET WARMER UPPER ADULT

## (undated) DEVICE — TUBING ASPIRATION HANDLE

## (undated) DEVICE — NEEDLE COUNTER  FOAM AND MAGNET 40-70

## (undated) DEVICE — WRAP COMPRESSION CALF MED

## (undated) DEVICE — SPONGE LAP PAD W RING 18X18"

## (undated) DEVICE — Device

## (undated) DEVICE — PACK LITHOTOMY